# Patient Record
Sex: MALE | Race: OTHER | HISPANIC OR LATINO | Employment: OTHER | URBAN - METROPOLITAN AREA
[De-identification: names, ages, dates, MRNs, and addresses within clinical notes are randomized per-mention and may not be internally consistent; named-entity substitution may affect disease eponyms.]

---

## 2018-03-19 ENCOUNTER — TRANSCRIBE ORDERS (OUTPATIENT)
Dept: ADMINISTRATIVE | Facility: HOSPITAL | Age: 28
End: 2018-03-19

## 2018-03-19 ENCOUNTER — APPOINTMENT (OUTPATIENT)
Dept: LAB | Facility: HOSPITAL | Age: 28
End: 2018-03-19
Payer: COMMERCIAL

## 2018-03-19 DIAGNOSIS — Z79.899 NEED FOR PROPHYLACTIC CHEMOTHERAPY: ICD-10-CM

## 2018-03-19 DIAGNOSIS — M35.2 BEHCET'S SYNDROME (HCC): ICD-10-CM

## 2018-03-19 DIAGNOSIS — M35.2 BEHCET'S SYNDROME (HCC): Primary | ICD-10-CM

## 2018-03-19 LAB
ALBUMIN SERPL BCP-MCNC: 3.8 G/DL (ref 3.5–5)
ALP SERPL-CCNC: 70 U/L (ref 46–116)
ALT SERPL W P-5'-P-CCNC: 35 U/L (ref 12–78)
ANION GAP SERPL CALCULATED.3IONS-SCNC: 9 MMOL/L (ref 4–13)
AST SERPL W P-5'-P-CCNC: 15 U/L (ref 5–45)
BASOPHILS # BLD AUTO: 0.02 THOUSANDS/ΜL (ref 0–0.1)
BASOPHILS NFR BLD AUTO: 0 % (ref 0–1)
BILIRUB SERPL-MCNC: 0.36 MG/DL (ref 0.2–1)
BUN SERPL-MCNC: 12 MG/DL (ref 5–25)
CALCIUM SERPL-MCNC: 9.3 MG/DL (ref 8.3–10.1)
CHLORIDE SERPL-SCNC: 103 MMOL/L (ref 100–108)
CO2 SERPL-SCNC: 30 MMOL/L (ref 21–32)
CREAT SERPL-MCNC: 0.89 MG/DL (ref 0.6–1.3)
EOSINOPHIL # BLD AUTO: 0.03 THOUSAND/ΜL (ref 0–0.61)
EOSINOPHIL NFR BLD AUTO: 0 % (ref 0–6)
ERYTHROCYTE [DISTWIDTH] IN BLOOD BY AUTOMATED COUNT: 13.6 % (ref 11.6–15.1)
ERYTHROCYTE [SEDIMENTATION RATE] IN BLOOD: 29 MM/HOUR (ref 0–10)
GFR SERPL CREATININE-BSD FRML MDRD: 118 ML/MIN/1.73SQ M
GLUCOSE P FAST SERPL-MCNC: 101 MG/DL (ref 65–99)
HCT VFR BLD AUTO: 38.9 % (ref 36.5–49.3)
HGB BLD-MCNC: 13.3 G/DL (ref 12–17)
LYMPHOCYTES # BLD AUTO: 3.04 THOUSANDS/ΜL (ref 0.6–4.47)
LYMPHOCYTES NFR BLD AUTO: 19 % (ref 14–44)
MCH RBC QN AUTO: 31.1 PG (ref 26.8–34.3)
MCHC RBC AUTO-ENTMCNC: 34.2 G/DL (ref 31.4–37.4)
MCV RBC AUTO: 91 FL (ref 82–98)
MONOCYTES # BLD AUTO: 1 THOUSAND/ΜL (ref 0.17–1.22)
MONOCYTES NFR BLD AUTO: 6 % (ref 4–12)
NEUTROPHILS # BLD AUTO: 12.18 THOUSANDS/ΜL (ref 1.85–7.62)
NEUTS SEG NFR BLD AUTO: 75 % (ref 43–75)
NRBC BLD AUTO-RTO: 0 /100 WBCS
PLATELET # BLD AUTO: 239 THOUSANDS/UL (ref 149–390)
PMV BLD AUTO: 10.7 FL (ref 8.9–12.7)
POTASSIUM SERPL-SCNC: 4.4 MMOL/L (ref 3.5–5.3)
PROT SERPL-MCNC: 8 G/DL (ref 6.4–8.2)
RBC # BLD AUTO: 4.27 MILLION/UL (ref 3.88–5.62)
SODIUM SERPL-SCNC: 142 MMOL/L (ref 136–145)
WBC # BLD AUTO: 16.27 THOUSAND/UL (ref 4.31–10.16)

## 2018-03-19 PROCEDURE — 80053 COMPREHEN METABOLIC PANEL: CPT

## 2018-03-19 PROCEDURE — 85025 COMPLETE CBC W/AUTO DIFF WBC: CPT

## 2018-03-19 PROCEDURE — 86480 TB TEST CELL IMMUN MEASURE: CPT

## 2018-03-19 PROCEDURE — 85652 RBC SED RATE AUTOMATED: CPT

## 2018-03-19 PROCEDURE — 36415 COLL VENOUS BLD VENIPUNCTURE: CPT

## 2018-03-21 LAB
ANNOTATION COMMENT IMP: NORMAL
GAMMA INTERFERON BACKGROUND BLD IA-ACNC: 0.02 IU/ML
M TB IFN-G BLD-IMP: NEGATIVE
M TB IFN-G CD4+ BCKGRND COR BLD-ACNC: 0 IU/ML
M TB IFN-G CD4+ T-CELLS BLD-ACNC: 0.02 IU/ML
MITOGEN IGNF BLD-ACNC: 6.83 IU/ML
QUANTIFERON-TB GOLD IN TUBE: NORMAL
SERVICE CMNT-IMP: NORMAL

## 2021-01-27 ENCOUNTER — HOSPITAL ENCOUNTER (EMERGENCY)
Facility: HOSPITAL | Age: 31
Discharge: HOME | End: 2021-01-27
Attending: EMERGENCY MEDICINE | Admitting: EMERGENCY MEDICINE
Payer: COMMERCIAL

## 2021-01-27 VITALS
OXYGEN SATURATION: 100 % | SYSTOLIC BLOOD PRESSURE: 136 MMHG | TEMPERATURE: 100.7 F | HEART RATE: 88 BPM | DIASTOLIC BLOOD PRESSURE: 63 MMHG | RESPIRATION RATE: 18 BRPM

## 2021-01-27 DIAGNOSIS — M25.50 JOINT PAIN: ICD-10-CM

## 2021-01-27 DIAGNOSIS — M35.2 BEHCET'S SYNDROME (HCC): Primary | ICD-10-CM

## 2021-01-27 DIAGNOSIS — L73.9 FOLLICULITIS: ICD-10-CM

## 2021-01-27 PROCEDURE — 99284 EMERGENCY DEPT VISIT MOD MDM: CPT | Performed by: EMERGENCY MEDICINE

## 2021-01-27 PROCEDURE — 99283 EMERGENCY DEPT VISIT LOW MDM: CPT

## 2021-01-27 RX ORDER — PROBENECID AND COLCHICINE 500; .5 MG/1; MG/1
2 TABLET ORAL DAILY
COMMUNITY

## 2021-01-27 RX ORDER — PREDNISONE 20 MG/1
TABLET ORAL DAILY
COMMUNITY

## 2021-01-27 RX ORDER — CEPHALEXIN 500 MG/1
500 CAPSULE ORAL 3 TIMES DAILY
Qty: 21 CAPSULE | Refills: 0 | Status: SHIPPED | OUTPATIENT
Start: 2021-01-27 | End: 2021-02-03

## 2021-01-27 NOTE — DISCHARGE INSTRUCTIONS
Your prescriptions for colchicine and prednisone were already called in to your pharmacy  I sent the Keflex prescription over to your pharmacy as well  I put in a referral for primary care

## 2021-01-27 NOTE — ED PROVIDER NOTES
History  Chief Complaint   Patient presents with    Joint Pain     Pt reports ran out of prednisone 5 days ago and unable to see rheumatologist  Charmayne Harsh generalized joint pain and pain in teeth  History provided by:  Patient   used: Yes    Medical Problem - Major  Location:  Patient has a history of Bechets syndrome takes prednisone and colchicine  He ran out 5 days ago  He says he called his rheumatologist and they never called him back  He complains of his typical symptoms of joint pain, rash which is over his body  He does not feel sick  He states no fever  No cough or shortness of breath  No abdominal pain  No genital lesions  No dysuria  Severity:  Severe  Onset quality:  Gradual  Duration:  5 days (Since he ran out of his medications)  Timing:  Constant  Progression:  Worsening  Chronicity:  Recurrent  Context:  He is on 20 mg of prednisone daily and has not had any for 5 days  Relieved by:  Nothing  Worsened by: Movement  Associated symptoms: rash    Associated symptoms: no abdominal pain, no chest pain, no congestion, no cough, no diarrhea, no fever, no headaches, no nausea, no shortness of breath and no vomiting        Prior to Admission Medications   Prescriptions Last Dose Informant Patient Reported?  Taking?   colchicine (COLCRYS) 0 6 mg tablet   No No   Sig: Take 1 tablet by mouth 2 (two) times a day for 30 days   colchicine-probenecid 0 5-500 MG per tablet   Yes Yes   Sig: Take 2 tablets by mouth daily   ibuprofen (MOTRIN) 800 mg tablet Not Taking at Unknown time  Yes No   Sig: Take 800 mg by mouth every 6 (six) hours as needed for mild pain   naproxen (NAPROSYN) 250 mg tablet   No No   Sig: Take 1 tablet by mouth 3 (three) times a day with meals for 30 days   ondansetron (ZOFRAN) 4 mg tablet   No No   Sig: Take 1 tablet by mouth every 6 (six) hours for 7 doses   predniSONE 10 mg tablet Not Taking at Unknown time  No No   Si mg daily for 3 days followed by 20 mg daily for 3 days followed by 10 mg daily for 3 days prn headache   Patient not taking: Reported on 1/27/2021   predniSONE 20 mg tablet   Yes Yes   Sig: Take by mouth daily      Facility-Administered Medications: None       Past Medical History:   Diagnosis Date    Uveitis        History reviewed  No pertinent surgical history  History reviewed  No pertinent family history  I have reviewed and agree with the history as documented  E-Cigarette/Vaping     E-Cigarette/Vaping Substances     Social History     Tobacco Use    Smoking status: Never Smoker   Substance Use Topics    Alcohol use: No    Drug use: No       Review of Systems   Constitutional: Negative for chills and fever  HENT: Positive for dental problem  Negative for congestion, trouble swallowing and voice change  Has a lesion on his gums   Respiratory: Negative for cough, chest tightness and shortness of breath  Cardiovascular: Negative for chest pain and leg swelling  Gastrointestinal: Negative for abdominal pain, diarrhea, nausea and vomiting  Genitourinary: Negative for difficulty urinating, dysuria and genital sores  Musculoskeletal: Positive for arthralgias and back pain  Skin: Positive for rash  Neurological: Negative for weakness, numbness and headaches  All other systems reviewed and are negative  Physical Exam  Physical Exam  Vitals signs and nursing note reviewed  Constitutional:       General: He is not in acute distress  Appearance: Normal appearance  He is well-developed  He is not ill-appearing, toxic-appearing or diaphoretic  HENT:      Head: Normocephalic and atraumatic  Comments: Anteriorly has a small area of gingival irritation in the upper central gum  It does not look like an aphthous ulcer or any type of ulceration and and there does not appear to be any erythema or signs of infection  Right Ear: Hearing normal  No drainage or swelling        Left Ear: Hearing normal  No drainage or swelling  Eyes:      General: Lids are normal          Right eye: No discharge  Left eye: No discharge  Extraocular Movements: Extraocular movements intact  Conjunctiva/sclera:      Right eye: Right conjunctiva is injected  No exudate  Left eye: Left conjunctiva is injected  No exudate  Neck:      Musculoskeletal: Normal range of motion  Vascular: No JVD  Trachea: Trachea normal    Cardiovascular:      Rate and Rhythm: Normal rate and regular rhythm  Pulses: Normal pulses  Heart sounds: Normal heart sounds  No murmur  No friction rub  No gallop  Pulmonary:      Effort: Pulmonary effort is normal  No respiratory distress  Breath sounds: Normal breath sounds  No stridor  No wheezing or rales  Abdominal:      Palpations: Abdomen is soft  Tenderness: There is no abdominal tenderness  There is no right CVA tenderness, left CVA tenderness, guarding or rebound  Genitourinary:     Penis: Uncircumcised  Comments: No rash    Musculoskeletal: Normal range of motion  General: No tenderness or deformity  Right lower leg: No edema  Left lower leg: No edema  Lymphadenopathy:      Cervical: No cervical adenopathy  Skin:     General: Skin is warm and dry  Coloration: Skin is not pale  Findings: No rash  Comments: Right shin anteriorly has large erythematous slightly tender macules that appear raised  On his left leg and thigh there are small little pustules that appear like folliculitis  Neurological:      General: No focal deficit present  Mental Status: He is alert  GCS: GCS eye subscore is 4  GCS verbal subscore is 5  GCS motor subscore is 6  Sensory: No sensory deficit  Motor: No abnormal muscle tone  Psychiatric:         Mood and Affect: Mood normal          Speech: Speech normal          Behavior: Behavior is cooperative                   Vital Signs  ED Triage Vitals   Temperature Pulse Respirations Blood Pressure SpO2   01/27/21 0857 01/27/21 0856 01/27/21 0856 01/27/21 0856 01/27/21 0856   (!) 100 7 °F (38 2 °C) 88 18 136/63 100 %      Temp Source Heart Rate Source Patient Position - Orthostatic VS BP Location FiO2 (%)   01/27/21 0856 01/27/21 0856 01/27/21 0856 01/27/21 0856 --   Oral Monitor Lying Right arm       Pain Score       01/27/21 0856       8           Vitals:    01/27/21 0856   BP: 136/63   Pulse: 88   Patient Position - Orthostatic VS: Lying         Visual Acuity      ED Medications  Medications - No data to display    Diagnostic Studies  Results Reviewed     None                 No orders to display              Procedures  Procedures         ED Course                                           MDM  Number of Diagnoses or Management Options  Behcet's syndrome ([facility] ): Folliculitis:   Joint pain:   Diagnosis management comments: I suspect that this is a flare-up of hisBechets due to lack of medication for the last 5 days  In reviewing the medical chart through Care everywhere it appears that his rheumatology office actually called in a prescription both at the end of November and recently to his pharmacy for the refill of the prednisone and the colchicine  Apparently he had for refills of his colchicine left as well  I am going to cover him with antibiotics for this folliculitis which looks a little different although this could also be a manifestation of Bechets as well  I also told him I will put in a referral so that he can get a primary care doctor as he only has his rheumatologist   He has an appointment with this rheumatologist in February and I told him to keep that appointment        Disposition  Final diagnoses:   Behcet's syndrome ([facility] )   Joint pain   Folliculitis     Time reflects when diagnosis was documented in both MDM as applicable and the Disposition within this note     Time User Action Codes Description Comment    1/27/2021  9:42 AM Viktor Gandara Add [M35 2] Behcet's syndrome ([facility] )     1/27/2021  9:42 AM Wellington Amando Add [M25 50] Joint pain     1/27/2021  9:42 AM Wellington Amando Add [W90 3] Folliculitis       ED Disposition     ED Disposition Condition Date/Time Comment    Discharge Stable Wed Jan 27, 2021  9:42 AM Eula Courtney discharge to home/self care  Follow-up Information     Follow up With Specialties Details Why Contact Info Additional Information    Your rheumatologist    Keep your appointment in february [address] [facility] Schedule an appointment as soon as possible for a visit  for primary care [address], [address] [id number]  [address], [address], [address] [place], [place], [address] [place] [address]          Discharge Medication List as of 1/27/2021  9:54 AM      START taking these medications    Details   cephalexin (KEFLEX) 500 mg capsule Take 1 capsule (500 mg total) by mouth 3 (three) times a day for 7 days, Starting Wed 1/27/2021, Until Wed 2/3/2021, Normal         CONTINUE these medications which have NOT CHANGED    Details   colchicine-probenecid 0 5-500 MG per tablet Take 2 tablets by mouth daily, Historical Med      !! predniSONE 20 mg tablet Take by mouth daily, Historical Med      colchicine (COLCRYS) 0 6 mg tablet Take 1 tablet by mouth 2 (two) times a day for 30 days, Starting 12/20/2016, Until Thu 1/19/17, Print      ibuprofen (MOTRIN) 800 mg tablet Take 800 mg by mouth every 6 (six) hours as needed for mild pain, Until Discontinued, Historical Med      naproxen (NAPROSYN) 250 mg tablet Take 1 tablet by mouth 3 (three) times a day with meals for 30 days, Starting 12/13/2016, Until Thu 1/12/17, Print      ondansetron (ZOFRAN) 4 mg tablet Take 1 tablet by mouth every 6 (six) hours for 7 doses, Starting 12/13/2016, Until Thu 12/15/16, Print      !! predniSONE 10 mg tablet 30 mg daily for 3 days followed by 20 mg daily for 3 days followed by 10 mg daily for 3 days prn headache, Print       !! - Potential duplicate medications found  Please discuss with provider              PDMP Review     None          ED Provider  Electronically Signed by           Caroleen Sandifer, MD  01/27/21 5591